# Patient Record
Sex: MALE | Race: OTHER | NOT HISPANIC OR LATINO
[De-identification: names, ages, dates, MRNs, and addresses within clinical notes are randomized per-mention and may not be internally consistent; named-entity substitution may affect disease eponyms.]

---
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
## 2023-01-05 ENCOUNTER — APPOINTMENT (OUTPATIENT)
Dept: PEDIATRIC DEVELOPMENTAL SERVICES | Facility: CLINIC | Age: 18
End: 2023-01-05
Payer: COMMERCIAL

## 2023-01-05 PROCEDURE — 99214 OFFICE O/P EST MOD 30 MIN: CPT | Mod: 25,95

## 2023-01-05 NOTE — PLAN
[Rationale for Medication Discussed] : The rationale for treating inattention, distractibility, hyperactivity, or impulsivity with medication was discussed. The desired effects, possible side effects, and need for monitoring response were reviewed. Information about various medication options was provided.  The option of not treating with medication was also discussed. [Cardiac risk factors for treatment] : Cardiac risk factors for treatment of stimulant medications were reviewed, including history of prior seizure, unexplained loss of consciousness, congenital heart disease, arrhythmias, or family history of sudden unexplained cardiac death in family members below the age of 40. [FreeTextEntry1] : \par ADHD-- increase Vyvanse to 70mg in the morning with food. Increase the Dexmethylphenidate HCL (10mg) booster to 2 tablets in the afternoon PRN. If booster is ineffective, trial of Adderall IR recommended. \par \par .STEPHANIE to call with any difficulties sleeping or increased appetite suppression. \par \par Topics discussed with parent, refer to counseling section of note.\par \par .Parent is aware to call the office as needed should any concerns or questions arise otherwise return in 3-4 months. \par \par  \par   [Findings (To Date)] : Findings from evaluation (to date) [Prognosis] : Prognosis [Goals / Benefits] : Goals & potential benefits of treatment with medication, as well as the limitations of pharmacotherapy [Stimulants] : Potential benefits and limitations of treatment with stimulant medication.  Potential adverse events were also reviewed, including insomnia, reduced appetite, change in blood pressure or heart rate, headache, stomachache, slowing of growth, moodiness, and onset of tics [Compliance] : Importance of medication compliance [AE Strategies] : Strategies to reduce side effects from current or proposed medication regimen [Family Questions] : Family's questions were addressed [Diet] : Evidence-based clinical information about diet [Sleep] : The importance of sleep and strategies to ensure adequate sleep [Media / Screen Time] : Importance of limiting electronics, media, and screen time [Exercise] : Regular exercise [Driving] : Safety issues related to driving, including the potential benefits of medication for ADHD [Reading] : Importance of daily reading [Injury Prevention] : injury prevention

## 2023-01-05 NOTE — REASON FOR VISIT
[Home] : at home, [unfilled] , at the time of the visit. [Medical Office: ([facility])___] : at the medical office located in  [Mother] : mother [Patient] : the patient [Self] : self [FreeTextEntry2] : .STEPHANIE Perea

## 2023-01-05 NOTE — HISTORY OF PRESENT ILLNESS
[FreeTextEntry1] : Video telehealth visit initially then switched to telephone (audio). .STEPHANIE states he has notices the Vyvanse 60mg dose is not lasting/focusing as long as it had prior. His current college Spring schedule fluctuates between long days (8a-8p) and short days (9a-5p). Ideally, .STEPHANIE would like the stimulant to last the full day vs. using the booster. Reviewed stimulant purpose and activation times. Advised .STEPHANIE since he has breaks in-between classes, no stimulant will last for 12hrs during his evening classes. Encouraged strongly to use the Dexmethylphenidate HCL 10mg booster for his evening classes. Discussed to monitor his sleep patterns. In the past, the 70mg Vyvanse would keep .STEPHANIE up until 2am (vs his regular bedtime at [facility]) and continue to be conscious to eat while on the stimulant. .STEPHANIE verbalized understanding. No other concerns or illness noted. \par \par  \par \par \par   [FreeTextEntry6] : appetite suppression and difficulty sleeping

## 2023-01-05 NOTE — PHYSICAL EXAM
[de-identified] : Telehealth visit done. No PE [de-identified] : .STEPHANIE presented to the visit in a pleasant and polite manner. He was engaged in the conversation and able to expand on any questioned asked with full detail and recall in complete sentences.

## 2023-02-10 ENCOUNTER — APPOINTMENT (OUTPATIENT)
Dept: PEDIATRIC DEVELOPMENTAL SERVICES | Facility: CLINIC | Age: 18
End: 2023-02-10

## 2023-05-12 ENCOUNTER — APPOINTMENT (OUTPATIENT)
Dept: PEDIATRIC DEVELOPMENTAL SERVICES | Facility: CLINIC | Age: 18
End: 2023-05-12
Payer: COMMERCIAL

## 2023-05-12 VITALS
HEIGHT: 73 IN | SYSTOLIC BLOOD PRESSURE: 116 MMHG | DIASTOLIC BLOOD PRESSURE: 74 MMHG | HEART RATE: 86 BPM | WEIGHT: 226 LBS | BODY MASS INDEX: 29.95 KG/M2

## 2023-05-12 DIAGNOSIS — Z86.2 PERSONAL HISTORY OF DISEASES OF THE BLOOD AND BLOOD-FORMING ORGANS AND CERTAIN DISORDERS INVOLVING THE IMMUNE MECHANISM: ICD-10-CM

## 2023-05-12 PROCEDURE — 99214 OFFICE O/P EST MOD 30 MIN: CPT | Mod: 25

## 2023-05-20 PROBLEM — Z86.2 HISTORY OF HEMOPHILIA: Status: RESOLVED | Noted: 2019-09-23 | Resolved: 2023-05-20

## 2023-05-20 NOTE — PHYSICAL EXAM
[External ears normal] : external ears normal [Person] : oriented to person [Time] : oriented to time [Normal] : patient has a normal gait [Attention Intact] : attention intact [Able to redirect] : able to redirect [Well-behaved during visit] : well-behaved during visit [Cooperative when examined] : cooperative when examined [Appropriate eye contact] : appropriate eye contact [Smiles responsively] : smiles responsively [Quiet/calm] : quiet/calm [Positive mood] : positive mood [Answered questions appropriately] : answered questions appropriately [Responds to name] : responds to name [Able to follow one step commands] : able to follow one step commands [Joint attention noted] : joint attention noted [Social referencing noted] : social referencing noted [Place] : disoriented to place [Easily Distracted] : not easily distracted [Needs frequent redirecting] : does not need frequent redirecting [Difficulty shifting attention or transitioning] : no difficulty shifting attention or transitioning [Fidgets] : does not fidget [Moves quickly from one activity to another] : does not move quickly from one activity to another [Oppositional] : not oppositional [Withholding] : no withholding [Negative mood] : no negative mood [Hypersensitive] : not hypersensitive [Echolalia] : no echolalia [de-identified] : \par ROBBIE   presented to the visit in a pleasant and polite manner. He was engaged in the conversation and able to expand on any questioned asked with full detail and recall in complete sentences. He is doing well and denies any smoking/vaping, drugs, suicidal ideations, safety concerns or high risk behaviors.\par .STEPHANIE is looking forward to summer vacation and being outdoors. He and his brother will be going to [place], then a family cruise and other vacations planned out. \par

## 2023-05-20 NOTE — HISTORY OF PRESENT ILLNESS
[Entering in September] : entering in September [Independent] : Independent [No IEP / 504] : No Individualized Education Program or Individualized Accommodation (504) Plan [FreeTextEntry6] : appetite suppression\par  [FreeTextEntry4] : Sept 2022--2nd year at [employer] ([employer]) \par  [FreeTextEntry1] : 2022-23 School Year-- .STEPHANIE plans to attend a full five day in-person learning schedule unless COVID guidelines change.  [TWNoteComboBox1] : College

## 2023-05-20 NOTE — REASON FOR VISIT
[Follow-Up Visit] : a follow-up visit for [ADHD] : ADHD [Response to Medication] : response to medication [Patient] : patient [FreeTextEntry4] : Vyvanse 70mg\par Dexmethylphenidate HCL (10mg) 2 tablets PRN [FreeTextEntry3] : Jan 5, 2023 via Telehealth

## 2023-05-20 NOTE — REVIEW OF SYSTEMS
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

## 2023-05-20 NOTE — PLAN
[Rationale for Medication Discussed] : The rationale for treating inattention, distractibility, hyperactivity, or impulsivity with medication was discussed. The desired effects, possible side effects, and need for monitoring response were reviewed. Information about various medication options was provided.  The option of not treating with medication was also discussed. [Cardiac risk factors for treatment] : Cardiac risk factors for treatment of stimulant medications were reviewed, including history of prior seizure, unexplained loss of consciousness, congenital heart disease, arrhythmias, or family history of sudden unexplained cardiac death in family members below the age of 40. [FreeTextEntry1] : \par ADHD-- continue Vyvanse to 70mg in the morning with food and Dexmethylphenidate HCL (10mg) booster 2 tablets in the afternoon PRN. If booster is ineffective, trial of Adderall IR recommended. \par \par .STEPHANIE to call with any difficulties sleeping or increased appetite suppression. \par \par Topics discussed with ROBBIE , refer to counseling section of note.\par \par .STEPHANIE  is aware to call the office as needed should any concerns or questions arise otherwise return in 6-7 months. \par \par  \par   [Findings (To Date)] : Findings from evaluation (to date) [Co-Morbidities] : Clinical disorders and problem commonly associated with this child's condition (now or in the future) [Prognosis] : Prognosis [Goals / Benefits] : Goals & potential benefits of treatment with medication, as well as the limitations of pharmacotherapy [Stimulants] : Potential benefits and limitations of treatment with stimulant medication.  Potential adverse events were also reviewed, including insomnia, reduced appetite, change in blood pressure or heart rate, headache, stomachache, slowing of growth, moodiness, and onset of tics [Compliance] : Importance of medication compliance [AE Strategies] : Strategies to reduce side effects from current or proposed medication regimen [504] : Entitlements under Section 504 of the Americans with Disabilities Act ("accommodation plans") [Family Questions] : Family's questions were addressed [Diet] : Evidence-based clinical information about diet [Sleep] : The importance of sleep and strategies to ensure adequate sleep [Media / Screen Time] : Importance of limiting electronics, media, and screen time [Exercise] : Regular exercise [Driving] : Safety issues related to driving, including the potential benefits of medication for ADHD [Drugs / Alcohol] : Drugs / Alcohol [Safe sex] : Safe sex [Injury Prevention] : injury prevention

## 2023-07-11 RX ORDER — LISDEXAMFETAMINE DIMESYLATE 70 MG/1
70 CAPSULE ORAL
Qty: 90 | Refills: 0 | Status: ACTIVE | COMMUNITY
Start: 2019-05-29 | End: 1900-01-01

## 2023-12-04 ENCOUNTER — APPOINTMENT (OUTPATIENT)
Dept: PEDIATRIC DEVELOPMENTAL SERVICES | Facility: CLINIC | Age: 18
End: 2023-12-04

## 2023-12-06 ENCOUNTER — APPOINTMENT (OUTPATIENT)
Dept: PEDIATRIC DEVELOPMENTAL SERVICES | Facility: CLINIC | Age: 18
End: 2023-12-06
Payer: COMMERCIAL

## 2023-12-06 VITALS
HEART RATE: 86 BPM | BODY MASS INDEX: 30.15 KG/M2 | HEIGHT: 72.5 IN | DIASTOLIC BLOOD PRESSURE: 72 MMHG | WEIGHT: 225 LBS | SYSTOLIC BLOOD PRESSURE: 112 MMHG

## 2023-12-06 PROCEDURE — 99215 OFFICE O/P EST HI 40 MIN: CPT | Mod: 25

## 2024-06-04 ENCOUNTER — APPOINTMENT (OUTPATIENT)
Dept: PEDIATRIC DEVELOPMENTAL SERVICES | Facility: CLINIC | Age: 19
End: 2024-06-04

## 2024-06-18 ENCOUNTER — APPOINTMENT (OUTPATIENT)
Dept: PEDIATRIC DEVELOPMENTAL SERVICES | Facility: CLINIC | Age: 19
End: 2024-06-18
Payer: COMMERCIAL

## 2024-06-18 VITALS
WEIGHT: 230 LBS | BODY MASS INDEX: 30.82 KG/M2 | SYSTOLIC BLOOD PRESSURE: 112 MMHG | HEART RATE: 86 BPM | DIASTOLIC BLOOD PRESSURE: 72 MMHG | HEIGHT: 72.5 IN

## 2024-06-18 DIAGNOSIS — Z79.899 OTHER LONG TERM (CURRENT) DRUG THERAPY: ICD-10-CM

## 2024-06-18 DIAGNOSIS — F90.1 ATTENTION-DEFICIT HYPERACTIVITY DISORDER, PREDOMINANTLY HYPERACTIVE TYPE: ICD-10-CM

## 2024-06-18 PROCEDURE — G2211 COMPLEX E/M VISIT ADD ON: CPT

## 2024-06-18 PROCEDURE — 99215 OFFICE O/P EST HI 40 MIN: CPT

## 2024-06-18 RX ORDER — DEXMETHYLPHENIDATE HYDROCHLORIDE 10 MG/1
10 TABLET ORAL
Qty: 60 | Refills: 0 | Status: ACTIVE | COMMUNITY
Start: 2020-05-18 | End: 1900-01-01

## 2024-06-18 NOTE — PLAN
[Rationale for Medication Discussed] : The rationale for treating inattention, distractibility, hyperactivity, or impulsivity with medication was discussed. The desired effects, possible side effects, and need for monitoring response were reviewed. Information about various medication options was provided.  The option of not treating with medication was also discussed. [Cardiac risk factors for treatment] : Cardiac risk factors for treatment of stimulant medications were reviewed, including history of prior seizure, unexplained loss of consciousness, congenital heart disease, arrhythmias, or family history of sudden unexplained cardiac death in family members below the age of 40. [Findings (To Date)] : Findings from evaluation (to date) [Prognosis] : Prognosis [Goals / Benefits] : Goals & potential benefits of treatment with medication, as well as the limitations of pharmacotherapy [Stimulants] : Potential benefits and limitations of treatment with stimulant medication.  Potential adverse events were also reviewed, including insomnia, reduced appetite, change in blood pressure or heart rate, headache, stomachache, slowing of growth, moodiness, and onset of tics [Compliance] : Importance of medication compliance [AE Strategies] : Strategies to reduce side effects from current or proposed medication regimen [Diet] : Evidence-based clinical information about diet [Sleep] : The importance of sleep and strategies to ensure adequate sleep [Media / Screen Time] : Importance of limiting electronics, media, and screen time [Exercise] : Regular exercise [Drugs / Alcohol] : Drugs / Alcohol [Safe sex] : Safe sex [Injury Prevention] : injury prevention [Transition] : transition to adulthood [FreeTextEntry1] :  ADHD-- continue Vyvanse to 70mg in the morning and Dexmethylphenidate HCL (10mg) booster 2 tablets BID PRN.  .STEPHANIE will call if he has any difficulties obtaining the stimulant for alternates.  Topics discussed with AndreSTEPHANIE , refer to counseling section of note. Transition of care to be discussed at next visit.  .STEPHANIE  is aware to call the office as needed should any concerns or questions arise otherwise return in 3-6 months.       [Driving] : Safety issues related to driving, including the potential benefits of medication for ADHD

## 2024-06-18 NOTE — HISTORY OF PRESENT ILLNESS
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  [FreeTextEntry1] : 2024-25 School Year-- .STEPHANIE plans to attend a full five day in-person learning schedule   [TWNoteComboBox1] : College

## 2024-06-18 NOTE — REASON FOR VISIT
[Follow-Up Visit] : a follow-up visit for [ADHD] : ADHD [Response to Medication] : response to medication [Patient] : patient [FreeTextEntry4] : Vyvanse 70mg\par  Dexmethylphenidate HCL (10mg) 2 tablets PRN [FreeTextEntry3] : May 12, 2023

## 2024-06-18 NOTE — PHYSICAL EXAM
[External ears normal] : external ears normal [Normal] : patient has a normal gait [Attention Intact] : attention intact [Well-behaved during visit] : well-behaved during visit [Cooperative when examined] : cooperative when examined [Smiles responsively] : smiles responsively [Quiet/calm] : quiet/calm [Positive mood] : positive mood [Answered questions appropriately] : answered questions appropriately [Easily Distracted] : not easily distracted [Needs frequent redirecting] : does not need frequent redirecting [Fidgets] : does not fidget [Moves quickly from one activity to another] : does not move quickly from one activity to another [Oppositional] : not oppositional [Negative mood] : no negative mood [Hypersensitive] : not hypersensitive [de-identified] : .STEPHANIE presented to the visit in his usual pleasant and respectful demeanor. He is doing well and denies any smoking/vaping, drugs, unprotected sexual encounters, suicidal ideations, safety concerns or high-risk behaviors.

## 2024-10-24 ENCOUNTER — APPOINTMENT (OUTPATIENT)
Dept: ORTHOPEDIC SURGERY | Facility: CLINIC | Age: 19
End: 2024-10-24
Payer: COMMERCIAL

## 2024-10-24 DIAGNOSIS — M25.512 PAIN IN RIGHT SHOULDER: ICD-10-CM

## 2024-10-24 DIAGNOSIS — M25.511 PAIN IN RIGHT SHOULDER: ICD-10-CM

## 2024-10-24 PROCEDURE — 73030 X-RAY EXAM OF SHOULDER: CPT | Mod: 50

## 2024-10-24 PROCEDURE — 99203 OFFICE O/P NEW LOW 30 MIN: CPT

## 2024-10-28 ENCOUNTER — APPOINTMENT (OUTPATIENT)
Dept: ORTHOPEDIC SURGERY | Facility: CLINIC | Age: 19
End: 2024-10-28
Payer: COMMERCIAL

## 2024-10-28 DIAGNOSIS — S39.012A STRAIN OF MUSCLE, FASCIA AND TENDON OF LOWER BACK, INITIAL ENCOUNTER: ICD-10-CM

## 2024-10-28 PROCEDURE — 72110 X-RAY EXAM L-2 SPINE 4/>VWS: CPT

## 2024-10-28 PROCEDURE — 99214 OFFICE O/P EST MOD 30 MIN: CPT

## 2024-12-13 ENCOUNTER — APPOINTMENT (OUTPATIENT)
Dept: ORTHOPEDIC SURGERY | Facility: CLINIC | Age: 19
End: 2024-12-13

## 2025-01-08 ENCOUNTER — NON-APPOINTMENT (OUTPATIENT)
Age: 20
End: 2025-01-08

## 2025-01-08 ENCOUNTER — APPOINTMENT (OUTPATIENT)
Dept: PEDIATRIC DEVELOPMENTAL SERVICES | Facility: CLINIC | Age: 20
End: 2025-01-08
Payer: COMMERCIAL

## 2025-01-08 VITALS
BODY MASS INDEX: 26.26 KG/M2 | SYSTOLIC BLOOD PRESSURE: 112 MMHG | DIASTOLIC BLOOD PRESSURE: 72 MMHG | WEIGHT: 196 LBS | HEART RATE: 88 BPM | HEIGHT: 72.5 IN

## 2025-01-08 DIAGNOSIS — F90.1 ATTENTION-DEFICIT HYPERACTIVITY DISORDER, PREDOMINANTLY HYPERACTIVE TYPE: ICD-10-CM

## 2025-01-08 PROCEDURE — 99215 OFFICE O/P EST HI 40 MIN: CPT

## 2025-01-08 PROCEDURE — G2211 COMPLEX E/M VISIT ADD ON: CPT

## 2025-01-27 ENCOUNTER — NON-APPOINTMENT (OUTPATIENT)
Age: 20
End: 2025-01-27

## 2025-01-27 ENCOUNTER — APPOINTMENT (OUTPATIENT)
Dept: ORTHOPEDIC SURGERY | Facility: CLINIC | Age: 20
End: 2025-01-27
Payer: COMMERCIAL

## 2025-01-27 DIAGNOSIS — S89.92XA UNSPECIFIED INJURY OF LEFT LOWER LEG, INITIAL ENCOUNTER: ICD-10-CM

## 2025-01-27 PROCEDURE — 99213 OFFICE O/P EST LOW 20 MIN: CPT

## 2025-01-27 PROCEDURE — 73562 X-RAY EXAM OF KNEE 3: CPT | Mod: LT

## 2025-01-27 RX ORDER — MELOXICAM 15 MG/1
15 TABLET ORAL
Qty: 30 | Refills: 0 | Status: ACTIVE | COMMUNITY
Start: 2025-01-27 | End: 1900-01-01

## 2025-01-30 ENCOUNTER — APPOINTMENT (OUTPATIENT)
Dept: ORTHOPEDIC SURGERY | Facility: CLINIC | Age: 20
End: 2025-01-30

## 2025-02-20 ENCOUNTER — APPOINTMENT (OUTPATIENT)
Dept: ORTHOPEDIC SURGERY | Facility: CLINIC | Age: 20
End: 2025-02-20

## 2025-04-03 ENCOUNTER — NON-APPOINTMENT (OUTPATIENT)
Age: 20
End: 2025-04-03

## 2025-07-15 ENCOUNTER — APPOINTMENT (OUTPATIENT)
Dept: PEDIATRIC DEVELOPMENTAL SERVICES | Facility: CLINIC | Age: 20
End: 2025-07-15
Payer: COMMERCIAL

## 2025-07-15 VITALS
SYSTOLIC BLOOD PRESSURE: 110 MMHG | WEIGHT: 223 LBS | HEIGHT: 73.62 IN | BODY MASS INDEX: 28.93 KG/M2 | DIASTOLIC BLOOD PRESSURE: 60 MMHG | HEART RATE: 102 BPM

## 2025-07-15 PROCEDURE — 99215 OFFICE O/P EST HI 40 MIN: CPT | Mod: 25
